# Patient Record
(demographics unavailable — no encounter records)

---

## 2020-11-16 NOTE — MRI
MRI Upper Ext Jt Rt WO Con



History: Degenerative disease of the acromioclavicular joint.



Comparison: Shoulder radiographs October 1, 2020



Findings: Biceps tendon: Biceps tendon is ruptured with no abnormal intra-articular or intra-articula
r tendon remaining.



Labrum: Circumferential labral maceration.



Rotator cuff: Full-thickness full width supraspinatus and infraspinatus tendon tears from the footpri
nt retracted to the glenoid. Severe tendinosis of the torn fibers with scar.



The subscapularis is also ruptured from the lesser tuberosity retracted 1.5 cm.



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 Sclerosed inferior margin of the acromi
on due to abnormal articulation with the humeral head. Large humeral head/neck osteophytes. Large

glenoid osteophytes.



Soft tissues: Moderate synovitis. Large ganglion pseudocyst extending from the acromioclavicular join
t containing debris measuring 5.5 cm in transverse with an AP dimension of 4.1 cm and a

craniocaudal dimension of 3.7 cm. This measures of the small neck to the distal clavicular intraosseo
us cyst.



Extensive chondral fissuring and fraying throughout the glenoid and humeral head.



Muscles: High-grade supraspinatus, infraspinatus, and subscapularis muscle atrophy.



Impression: 

1. Corresponding to the soft tissue mass is a large ganglion pseudocyst with the neck extending from 
the distal clavicular intraosseous cyst into the superficial soft tissues just superficial to the

trapezius muscle. Although solid component evaluation is limited without intravenous contrast, none i
s felt to be likely.

2. Full-thickness full width retracted supraspinatus, infraspinatus and subscapularis tendon tear fro
m the footprint with high-grade atrophy.

3. Circumferential labral maceration.

4. Chronically torn and retracted biceps tendon.

5. Advanced degenerative disease throughout the shoulder.



Reported By: Bebo Strauss 

Electronically Signed:  11/16/2020 12:02 PM